# Patient Record
Sex: FEMALE | URBAN - METROPOLITAN AREA
[De-identification: names, ages, dates, MRNs, and addresses within clinical notes are randomized per-mention and may not be internally consistent; named-entity substitution may affect disease eponyms.]

---

## 2017-01-04 ENCOUNTER — EMERGENCY (EMERGENCY)
Facility: HOSPITAL | Age: 47
LOS: 1 days | Discharge: HOME | End: 2017-01-04
Admitting: EMERGENCY MEDICINE
Payer: COMMERCIAL

## 2017-01-04 DIAGNOSIS — R07.89 OTHER CHEST PAIN: ICD-10-CM

## 2017-01-04 DIAGNOSIS — R07.9 CHEST PAIN, UNSPECIFIED: ICD-10-CM

## 2017-01-04 PROCEDURE — 99285 EMERGENCY DEPT VISIT HI MDM: CPT

## 2017-01-04 PROCEDURE — 93010 ELECTROCARDIOGRAM REPORT: CPT

## 2017-01-04 PROCEDURE — 71010: CPT | Mod: 26

## 2017-02-21 ENCOUNTER — TRANSCRIPTION ENCOUNTER (OUTPATIENT)
Age: 47
End: 2017-02-21

## 2017-10-04 ENCOUNTER — TRANSCRIPTION ENCOUNTER (OUTPATIENT)
Age: 47
End: 2017-10-04

## 2018-02-28 ENCOUNTER — TRANSCRIPTION ENCOUNTER (OUTPATIENT)
Age: 48
End: 2018-02-28

## 2024-04-12 ENCOUNTER — APPOINTMENT (OUTPATIENT)
Dept: ORTHOPEDIC SURGERY | Facility: CLINIC | Age: 54
End: 2024-04-12
Payer: MEDICAID

## 2024-04-12 ENCOUNTER — NON-APPOINTMENT (OUTPATIENT)
Age: 54
End: 2024-04-12

## 2024-04-12 VITALS — HEIGHT: 64 IN | BODY MASS INDEX: 33.8 KG/M2 | WEIGHT: 198 LBS

## 2024-04-12 DIAGNOSIS — M17.12 UNILATERAL PRIMARY OSTEOARTHRITIS, LEFT KNEE: ICD-10-CM

## 2024-04-12 DIAGNOSIS — M25.562 PAIN IN LEFT KNEE: ICD-10-CM

## 2024-04-12 DIAGNOSIS — T84.82XA FIBROSIS DUE TO INTERNAL ORTHOPEDIC PROSTHETIC DEVICES, IMPLANTS AND GRAFTS, INITIAL ENCOUNTER: ICD-10-CM

## 2024-04-12 PROCEDURE — 73564 X-RAY EXAM KNEE 4 OR MORE: CPT | Mod: LT

## 2024-04-12 PROCEDURE — 99204 OFFICE O/P NEW MOD 45 MIN: CPT | Mod: 25

## 2024-04-12 NOTE — HISTORY OF PRESENT ILLNESS
[de-identified] : 4/12/24: Patient presents with years of left knee pain.  Has gotten worse over the last 4 months.  Pain is mostly the medial aspect of her knee.  Does have buckling and swelling.  Stairs are difficult.  Taking meloxicam, naproxen, Tylenol for the pain.  Also has problems with her right knee.  She is status post right total knee replacement by Dr. Farrell at [facility], done 3/16/2023.  States the blockaded nerve and she could not move after the surgery.  Underwent manipulation under anesthesia but still suffered from arthrofibrosis.  Denies allergies, anticoagulation, tobacco use, PMH-hypertension

## 2024-04-12 NOTE — PHYSICAL EXAM
[de-identified] : Left lower extremity Hip: Normal ROM without pain on IR/ER Knee Inspection: no effusion Wounds: None Alignment: Neutral Palpation: Exquisitely tender to palpation at medial joint line ROM active (in degrees): 0-120 with crepitus/pain through the arc of motion Ligamentous laxity: all ligaments appear stable, stable to varus stress test, stable to valgus stress test Meniscal Test: Positive McMurrays, negative Kelly. Muscle Test: good quad strength. [de-identified] : 4/12/24: Left knee xrays, standing AP/Lateral and Merchant films, and 45 degree PA standing view are reviewed and demonstrate diffuse tricompartmental degenerative arthritis, medial joint space narrowing, marginal osteophytes, patellofemoral joint space narrowing

## 2024-04-12 NOTE — DISCUSSION/SUMMARY
[de-identified] : Left knee moderate to severe osteoarthritis, right knee arthrofibrosis  Extensive discussion of the natural history of this disease was had with the patient.  We discussed the treatment options ranging from conservative therapy which includes anti-inflammatories, steroid/HA injections, physical therapy, weight loss, knee sleeves/braces, and activity modification.  We did discuss that the ultimate treatment for arthritis is a joint replacement.  At this time we will continue conservative treatment.   -Physical therapy prescription was given to the patient. Patient is going to think about the injections as she is afraid of needles. Extensive discussion about the right knee arthrofibrosis.  Discussed she could consider arthroscopic lysis of adhesions versus a revision.  Discussed the extensive risks of this as well as no guarantee for success. Patient will follow-up as needed if the pain returns or does not improve.  The patient's current medication management of their orthopedic diagnosis was reviewed today: (1) We discussed a comprehensive treatment plan that included possible pharmaceutical management involving the use of prescription strength medications including but not limited to options such as oral Ibuprofen 400mg QID, once daily Meloxicam 15 mg, or 500-650 mg Tylenol versus over the counter oral medications and topical prescription NSAID Pennsaid vs over the counter Voltaren gel. (2) There is a moderate risk of morbidity with further treatment, especially from use of prescription strength medications and possible side effects of these medications which include upset stomach with oral medications, skin reactions to topical medications and cardiac/renal issues with long term use. (3) I recommended that the patient follow-up with their medical physician to discuss any significant specific potential issues with long term medication use such as interactions with current medications or with exacerbation of underlying medical comorbidities. (4) The benefits and risks associated with use of injectable, oral or topical, prescription and over the counter anti-inflammatory medications were discussed with the patient. The patient voiced understanding of the risks including but not limited to bleeding, stroke, kidney dysfunction, heart disease, and were referred to the black box warning label for further information.

## 2024-05-20 ENCOUNTER — TRANSCRIPTION ENCOUNTER (OUTPATIENT)
Age: 54
End: 2024-05-20